# Patient Record
Sex: MALE | Race: WHITE | NOT HISPANIC OR LATINO | Employment: FULL TIME | URBAN - METROPOLITAN AREA
[De-identification: names, ages, dates, MRNs, and addresses within clinical notes are randomized per-mention and may not be internally consistent; named-entity substitution may affect disease eponyms.]

---

## 2023-08-26 ENCOUNTER — OFFICE VISIT (OUTPATIENT)
Dept: URGENT CARE | Facility: CLINIC | Age: 53
End: 2023-08-26
Payer: COMMERCIAL

## 2023-08-26 ENCOUNTER — APPOINTMENT (OUTPATIENT)
Dept: RADIOLOGY | Facility: IMAGING CENTER | Age: 53
End: 2023-08-26
Attending: FAMILY MEDICINE
Payer: COMMERCIAL

## 2023-08-26 VITALS
SYSTOLIC BLOOD PRESSURE: 126 MMHG | HEART RATE: 89 BPM | TEMPERATURE: 97.9 F | RESPIRATION RATE: 20 BRPM | WEIGHT: 175.2 LBS | DIASTOLIC BLOOD PRESSURE: 78 MMHG | HEIGHT: 72 IN | OXYGEN SATURATION: 100 % | BODY MASS INDEX: 23.73 KG/M2

## 2023-08-26 DIAGNOSIS — R06.02 SHORTNESS OF BREATH: ICD-10-CM

## 2023-08-26 PROCEDURE — 99203 OFFICE O/P NEW LOW 30 MIN: CPT | Performed by: FAMILY MEDICINE

## 2023-08-26 PROCEDURE — 3074F SYST BP LT 130 MM HG: CPT | Performed by: FAMILY MEDICINE

## 2023-08-26 PROCEDURE — 71046 X-RAY EXAM CHEST 2 VIEWS: CPT | Mod: TC | Performed by: FAMILY MEDICINE

## 2023-08-26 PROCEDURE — 3078F DIAST BP <80 MM HG: CPT | Performed by: FAMILY MEDICINE

## 2023-08-26 ASSESSMENT — ENCOUNTER SYMPTOMS: FEVER: 0

## 2023-08-26 NOTE — PROGRESS NOTES
"Subjective:     Prieto Westbrook is a 53 y.o. male who presents for Shortness of Breath (X5days fatigue/)    HPI  Pt presents for evaluation of an acute problem  Patient with shortness of breath for last 5 days  Also feeling fatigue  [place], has only been in [place] for a few days and symptoms started before he got to [place].  Has had worsening fatigue since he arrived in [place]  Pt is a current smoker, has been smoking since 17 years, no diagnosis of COPD in the past  Has no known heart or lung problems  Does not feel ill and has no cough, rhinorrhea, sore throat, vomiting  Has chronic hemorrhoids which have \"a lot\" of bleeding sometimes    Review of Systems   Constitutional:  Negative for fever.   Skin:  Negative for rash.     PMH:  has no past medical history on file.  MEDS: No current outpatient medications on file.  ALLERGIES: No Known Allergies  SURGHX: History reviewed. No pertinent surgical history.  SOCHX:  reports that he has been smoking cigarettes. He has never used smokeless tobacco. He reports current alcohol use. He reports that he does not use drugs.     Objective:   /78 (BP Location: Left arm, Patient Position: Sitting)   Pulse 89   Temp 36.6 °C (97.9 °F) (Temporal)   Resp 20   Ht 1.829 m (6')   Wt 79.5 kg (175 lb 3.2 oz)   SpO2 100%   BMI 23.76 kg/m²     Physical Exam  Constitutional:       General: He is not in acute distress.     Appearance: He is well-developed. He is not diaphoretic.   HENT:      Head: Normocephalic and atraumatic.   Neck:      Trachea: No tracheal deviation.   Cardiovascular:      Rate and Rhythm: Normal rate and regular rhythm.      Heart sounds: Normal heart sounds.   Pulmonary:      Effort: Pulmonary effort is normal. No respiratory distress.      Breath sounds: Normal breath sounds. No wheezing or rales.   Musculoskeletal:         General: Normal range of motion.   Skin:     General: Skin is warm and dry.      Findings: No " rash.   Neurological:      Mental Status: He is alert.   Psychiatric:         Behavior: Behavior normal.         Thought Content: Thought content normal.         Judgment: Judgment normal.       Assessment/Plan:   Assessment    1. Shortness of breath  - DX-CHEST-2 VIEWS; Future    Patient here for evaluation of shortness of breath.  Has clear lungs, normal chest x-ray, and no sign of COPD exacerbation.  Most concerned that patient is starting to develop anemia.  He is about to leave to return home to [place] tomorrow morning.  He would prefer to have any labs done through his PCP so that his physician has all test results.  He declines any lab testing done here in [place] prior to before he leaves.  His blood pressure is 126/78, not tachycardic, and do not suspect severe anemia which would require inpatient management.  He appears safe for outpatient management and delaying labs until the end of the weekend with his PCP is reasonable at this point.  Did recommend that he start taking B vitamins and he states he already owns a B12/B6 vitamin which he has not been taking.  Will also start taking iron prior to seeing his PCP next week.